# Patient Record
Sex: MALE | Race: WHITE | NOT HISPANIC OR LATINO | Employment: FULL TIME | URBAN - METROPOLITAN AREA
[De-identification: names, ages, dates, MRNs, and addresses within clinical notes are randomized per-mention and may not be internally consistent; named-entity substitution may affect disease eponyms.]

---

## 2023-05-27 DIAGNOSIS — M1A.09X0 CHRONIC GOUT OF MULTIPLE SITES, UNSPECIFIED CAUSE: Primary | ICD-10-CM

## 2023-05-30 RX ORDER — FEBUXOSTAT 80 MG/1
TABLET, FILM COATED ORAL
Qty: 90 TABLET | Refills: 3 | Status: SHIPPED | OUTPATIENT
Start: 2023-05-30 | End: 2024-06-04

## 2023-06-08 RX ORDER — COLCHICINE 0.6 MG/1
0.6 CAPSULE ORAL 2 TIMES DAILY
Qty: 60 CAPSULE | Refills: 2 | Status: SHIPPED | OUTPATIENT
Start: 2023-06-08 | End: 2023-11-13

## 2023-06-08 RX ORDER — COLCHICINE 0.6 MG/1
0.6 CAPSULE ORAL 2 TIMES DAILY
COMMUNITY
End: 2023-06-08 | Stop reason: SDUPTHER

## 2023-06-13 NOTE — PROGRESS NOTES
Subjective   JANI Temple is a 38 y.o. male who presents for status, and for follow-up.  HPI  Patient is a 38-year-old male with known history of gout, is here for follow-up and fasting blood work, takes medication as prescribed including Medicare and Uloric.  Review of Systems  10 system review pertinent as above  Objective     There were no vitals taken for this visit.   Physical Exam  HEENT: Atraumatic normocephalic the pupils are equal and round and reactive to light the sclerae nonicteric extraocular motion are intact.  Neck: Is supple without JVD no carotid bruits the trachea is midline there are no masses pulses are equal and bilateral with normal upstroke.  Skin: Normal.  Skin good texture.  Moist.  Good turgor.  No lesions, no rashes.  Lymph: No lymphadenopathy appreciated, no masses, no lesions  Lungs: Are clear to auscultation and percussion, good breath sounds bilaterally, no rhonchi, no wheezing, good diaphragmatic excursion.  Heart: Normal rate and normal rhythm S1, S2, no S3, no gallop, murmur or rub.  Abdomen: Soft, nontender, no organomegaly, good bowel sounds.    Extremities: Full range of motion, good pulses bilateral.  No cyanosis, no clubbing or edema.  Neuro: Cranial nerves II-XII are grossly intact there is no sensory or motor deficits.  Able to move all extremities.      Assessment/Plan     Patient is here for follow-up fasting blood work will be done in December  Patient ate today.     CBC BMP lipids AST ALT vitamin D 25 Uric acid level    History of chronic  Continue colchicine  And Mitigare    Continue with the low-fat, low-cholesterol diet,  I recommended Mediterranean diet, which include fish, chicken, vegetables and olive oil  Exercise daily for 30 minutes at least 3 times a week  Continue home medications    BMI 33.47 Kg meter sq  On mounjaro through an outside agency  Tripeptide for wt loss   Must lose weight.     Severe gout  On uloric and colchicine  Without daily colchicine  patient is extremely symptomatic    Problem List Items Addressed This Visit       BMI 33.0-33.9,adult - Primary    Relevant Medications    tirzepatide (Mounjaro) 5 mg/0.5 mL pen injector    Chronic gout with tophus    Relevant Medications    tirzepatide (Mounjaro) 5 mg/0.5 mL pen injector         Santiago Rosales MD

## 2023-06-15 ENCOUNTER — OFFICE VISIT (OUTPATIENT)
Dept: PRIMARY CARE | Facility: CLINIC | Age: 38
End: 2023-06-15
Payer: COMMERCIAL

## 2023-06-15 VITALS — HEIGHT: 76 IN | WEIGHT: 275 LBS | BODY MASS INDEX: 33.49 KG/M2

## 2023-06-15 DIAGNOSIS — M1A.9XX1 CHRONIC GOUT WITH TOPHUS, UNSPECIFIED CAUSE, UNSPECIFIED SITE: ICD-10-CM

## 2023-06-15 PROCEDURE — 1036F TOBACCO NON-USER: CPT | Performed by: INTERNAL MEDICINE

## 2023-06-15 PROCEDURE — 99214 OFFICE O/P EST MOD 30 MIN: CPT | Performed by: INTERNAL MEDICINE

## 2023-06-15 PROCEDURE — 3008F BODY MASS INDEX DOCD: CPT | Performed by: INTERNAL MEDICINE

## 2023-06-15 RX ORDER — TIRZEPATIDE 5 MG/.5ML
5 INJECTION, SOLUTION SUBCUTANEOUS
Qty: 3 ML | Refills: 0 | Status: SHIPPED | OUTPATIENT
Start: 2023-06-15 | End: 2024-03-19 | Stop reason: ALTCHOICE

## 2023-11-13 DIAGNOSIS — M1A.09X0 CHRONIC GOUT OF MULTIPLE SITES, UNSPECIFIED CAUSE: ICD-10-CM

## 2023-11-13 RX ORDER — COLCHICINE 0.6 MG/1
0.6 CAPSULE ORAL 2 TIMES DAILY
Qty: 60 CAPSULE | Refills: 0 | Status: SHIPPED | OUTPATIENT
Start: 2023-11-13 | End: 2024-03-04

## 2024-03-03 DIAGNOSIS — M1A.09X0 CHRONIC GOUT OF MULTIPLE SITES, UNSPECIFIED CAUSE: ICD-10-CM

## 2024-03-04 RX ORDER — COLCHICINE 0.6 MG/1
0.6 CAPSULE ORAL 2 TIMES DAILY
Qty: 60 CAPSULE | Refills: 0 | Status: SHIPPED | OUTPATIENT
Start: 2024-03-04 | End: 2024-03-05

## 2024-03-05 DIAGNOSIS — M1A.09X0 CHRONIC GOUT OF MULTIPLE SITES, UNSPECIFIED CAUSE: ICD-10-CM

## 2024-03-05 RX ORDER — COLCHICINE 0.6 MG/1
0.6 CAPSULE ORAL 2 TIMES DAILY
Qty: 60 CAPSULE | Refills: 3 | Status: SHIPPED | OUTPATIENT
Start: 2024-03-05

## 2024-03-15 NOTE — PROGRESS NOTES
Subjective   JANI Temple is a 39 y.o. male who presents for status, and for follow-up.  HPI  Patient is a 38-year-old male with known history of gout, is here for follow-up and fasting blood work, takes medication as prescribed including Medicare and Uloric.  Patient last blood test in May 13, 2022 over very good, he has not had any blood work since then. Doing well with , anxious  lately feels depressed, anhedonic lack of happiness.   Review of Systems  10 system review pertinent as above  Objective     Visit Vitals  /80   Pulse 78   Temp 36.3 °C (97.3 °F) (Temporal)   Resp 16      Physical Exam  HEENT: Atraumatic normocephalic the pupils are equal and round and reactive to light the sclerae nonicteric extraocular motion are intact.  Neck: Is supple without JVD no carotid bruits the trachea is midline there are no masses pulses are equal and bilateral with normal upstroke.  Skin: Normal.  Skin good texture.  Moist.  Good turgor.  No lesions, no rashes.  Lymph: No lymphadenopathy appreciated, no masses, no lesions  Lungs: Are clear to auscultation and percussion, good breath sounds bilaterally, no rhonchi, no wheezing, good diaphragmatic excursion.  Heart: Normal rate and normal rhythm S1, S2, no S3, no gallop, murmur or rub.  Abdomen: Soft, nontender, no organomegaly, good bowel sounds.    Extremities: Full range of motion, good pulses bilateral.  No cyanosis, no clubbing or edema.  Neuro: Cranial nerves II-XII are grossly intact there is no sensory or motor deficits.  Able to move all extremities.      Assessment/Plan     Number follow-up needs updated blood work    CBC BMP lipids AST ALT vitamin D 25 Uric acid level    History of chronic  Continue colchicine  And uloric low purine diet   And Mitigare    Depression situational   Short tempred anhedonia  Lack of desire  Effexor xr 37.5 mg daily    Continue with the low-fat, low-cholesterol diet,  I recommended Mediterranean diet, which include fish,  chicken, vegetables and olive oil  Exercise daily for 30 minutes at least 3 times a week  Continue home medications    BMI 33.47 Kg meter sq  On mounjaro through an outside agency  Tripeptide for wt loss   Must lose weight.     Severe gout  On uloric and colchicine  Without daily colchicine patient is extremely symptomatic    Problem List Items Addressed This Visit       BMI 33.0-33.9,adult - Primary    Relevant Orders    Basic Metabolic Panel    Chronic gout with tophus    Relevant Orders    Basic Metabolic Panel    CBC    Uric acid     Other Visit Diagnoses       Dyslipidemia        Relevant Orders    Lipid Panel    AST    ALT    Anxiety        Relevant Medications    venlafaxine XR (Effexor-XR) 37.5 mg 24 hr capsule            Santiago Rosales MD

## 2024-03-19 ENCOUNTER — OFFICE VISIT (OUTPATIENT)
Dept: PRIMARY CARE | Facility: CLINIC | Age: 39
End: 2024-03-19
Payer: COMMERCIAL

## 2024-03-19 VITALS
RESPIRATION RATE: 16 BRPM | TEMPERATURE: 97.3 F | HEART RATE: 78 BPM | SYSTOLIC BLOOD PRESSURE: 124 MMHG | HEIGHT: 76 IN | WEIGHT: 274 LBS | BODY MASS INDEX: 33.36 KG/M2 | DIASTOLIC BLOOD PRESSURE: 80 MMHG

## 2024-03-19 DIAGNOSIS — F41.9 ANXIETY: ICD-10-CM

## 2024-03-19 DIAGNOSIS — M1A.9XX1 CHRONIC GOUT WITH TOPHUS, UNSPECIFIED CAUSE, UNSPECIFIED SITE: ICD-10-CM

## 2024-03-19 DIAGNOSIS — E78.5 DYSLIPIDEMIA: ICD-10-CM

## 2024-03-19 PROCEDURE — 99214 OFFICE O/P EST MOD 30 MIN: CPT | Performed by: INTERNAL MEDICINE

## 2024-03-19 PROCEDURE — 1036F TOBACCO NON-USER: CPT | Performed by: INTERNAL MEDICINE

## 2024-03-19 PROCEDURE — 3008F BODY MASS INDEX DOCD: CPT | Performed by: INTERNAL MEDICINE

## 2024-03-19 RX ORDER — VENLAFAXINE HYDROCHLORIDE 37.5 MG/1
37.5 CAPSULE, EXTENDED RELEASE ORAL DAILY
Qty: 30 CAPSULE | Refills: 1 | Status: SHIPPED | OUTPATIENT
Start: 2024-03-19 | End: 2024-05-28

## 2024-03-19 ASSESSMENT — PAIN SCALES - GENERAL: PAINLEVEL: 0-NO PAIN

## 2024-04-19 ENCOUNTER — APPOINTMENT (OUTPATIENT)
Dept: PRIMARY CARE | Facility: CLINIC | Age: 39
End: 2024-04-19
Payer: COMMERCIAL

## 2024-05-10 ENCOUNTER — APPOINTMENT (OUTPATIENT)
Dept: PRIMARY CARE | Facility: CLINIC | Age: 39
End: 2024-05-10
Payer: COMMERCIAL

## 2024-05-25 DIAGNOSIS — F41.9 ANXIETY: ICD-10-CM

## 2024-05-28 RX ORDER — VENLAFAXINE HYDROCHLORIDE 37.5 MG/1
37.5 CAPSULE, EXTENDED RELEASE ORAL DAILY
Qty: 30 CAPSULE | Refills: 3 | Status: SHIPPED | OUTPATIENT
Start: 2024-05-28

## 2024-06-04 DIAGNOSIS — M1A.09X0 CHRONIC GOUT OF MULTIPLE SITES, UNSPECIFIED CAUSE: ICD-10-CM

## 2024-06-04 RX ORDER — FEBUXOSTAT 80 MG/1
80 TABLET, FILM COATED ORAL DAILY
Qty: 90 TABLET | Refills: 0 | Status: SHIPPED | OUTPATIENT
Start: 2024-06-04

## 2024-07-24 NOTE — PROGRESS NOTES
Subjective   JANI Temple is a 39 y.o. male who presents for status, Physical exam.  HPI  Here for a physical exam ,Patient is a 39-year-old male with known history of gout, is here for follow-up and fasting blood work, takes medication as prescribed including Medicare and Uloric.  Patient last blood test in May 13, 2022 over very good, he has not had any blood work since then. Doing well with , anxious  lately feels depressed, anhedonic lack of happiness. Patient works out 3-4 times per week, feeling well. Sleep is good  mostly ok.  Fasting today for blood work. Complaining of decrease libido.   Review of Systems  10 system review pertinent as above  Objective     Visit Vitals  /82   Pulse 78   Temp 36.6 °C (97.9 °F)   Resp 16        Physical Exam  HEENT: Atraumatic normocephalic the pupils are equal and round and reactive to light the sclerae nonicteric extraocular motion are intact.  Neck: Is supple without JVD no carotid bruits the trachea is midline there are no masses pulses are equal and bilateral with normal upstroke.  Skin: Normal.  Skin good texture.  Moist.  Good turgor.  No lesions, no rashes.  Lymph: No lymphadenopathy appreciated, no masses, no lesions  Lungs: Are clear to auscultation and percussion, good breath sounds bilaterally, no rhonchi, no wheezing, good diaphragmatic excursion.  Heart: Normal rate and normal rhythm S1, S2, no S3, no gallop, murmur or rub.  Abdomen: Soft, nontender, no organomegaly, good bowel sounds.    Extremities: Full range of motion, good pulses bilateral.  No cyanosis, no clubbing or edema.  Neuro: Cranial nerves II-XII are grossly intact there is no sensory or motor deficits.  Able to move all extremities.      Assessment/Plan     Physical exam     CBC BMP lipids AST ALT vitamin D 25 Uric acid level    Prevention    Colonoscopy Age  43  PSA   Age 43    Immunization    2/2 and one booster    Flu vaccine declined got sick from vaccine      Depression situational    Short tempred anhedonia  Lack of desire  Effexor xr 37.5 mg daily  Patient is doing well     Continue with the low-fat, low-cholesterol diet,  I recommended Mediterranean diet, which include fish, chicken, vegetables and olive oil Exercise daily for 30 minutes at least 3 times a week  Continue home medications  Diet and exercise    BMI 32.50 Kg meter sq  Lost 7 lbs  Diet and exercise   Low caloric restrictive diet.      Severe gout  Continue colchicine  And uloric low purine diet   Low purine diet keep your weight down  On uloric and colchicine  Without daily colchicine patient is extremely symptomatic    Problem List Items Addressed This Visit    None        Santiago Rosales MD

## 2024-07-26 ENCOUNTER — APPOINTMENT (OUTPATIENT)
Dept: PRIMARY CARE | Facility: CLINIC | Age: 39
End: 2024-07-26
Payer: COMMERCIAL

## 2024-07-26 VITALS
RESPIRATION RATE: 16 BRPM | BODY MASS INDEX: 32.51 KG/M2 | WEIGHT: 267 LBS | HEIGHT: 76 IN | HEART RATE: 78 BPM | SYSTOLIC BLOOD PRESSURE: 122 MMHG | DIASTOLIC BLOOD PRESSURE: 82 MMHG | TEMPERATURE: 97.9 F

## 2024-07-26 DIAGNOSIS — J30.2 SEASONAL ALLERGIES: ICD-10-CM

## 2024-07-26 DIAGNOSIS — E55.9 VITAMIN D DEFICIENCY: ICD-10-CM

## 2024-07-26 DIAGNOSIS — E78.00 ELEVATED LDL CHOLESTEROL LEVEL: ICD-10-CM

## 2024-07-26 DIAGNOSIS — R68.82 DECREASED LIBIDO: ICD-10-CM

## 2024-07-26 DIAGNOSIS — M1A.9XX1 CHRONIC GOUT WITH TOPHUS, UNSPECIFIED CAUSE, UNSPECIFIED SITE: ICD-10-CM

## 2024-07-26 DIAGNOSIS — E78.5 DYSLIPIDEMIA: ICD-10-CM

## 2024-07-26 DIAGNOSIS — E55.9 VITAMIN D INSUFFICIENCY: ICD-10-CM

## 2024-07-26 LAB
TESTOST SERPL-MCNC: 518 NG/DL (ref 240–1000)
URATE SERPL-MCNC: 4.5 MG/DL (ref 4–7.5)

## 2024-07-26 PROCEDURE — 84550 ASSAY OF BLOOD/URIC ACID: CPT

## 2024-07-26 PROCEDURE — 36415 COLL VENOUS BLD VENIPUNCTURE: CPT

## 2024-07-26 PROCEDURE — 84403 ASSAY OF TOTAL TESTOSTERONE: CPT

## 2024-07-26 ASSESSMENT — ENCOUNTER SYMPTOMS
DEPRESSION: 0
OCCASIONAL FEELINGS OF UNSTEADINESS: 0
LOSS OF SENSATION IN FEET: 0

## 2024-07-26 ASSESSMENT — PAIN SCALES - GENERAL: PAINLEVEL: 0-NO PAIN

## 2024-07-29 DIAGNOSIS — L72.3 SEBACEOUS CYST: Primary | ICD-10-CM

## 2024-07-31 PROBLEM — Z00.00 PHYSICAL EXAM: Status: ACTIVE | Noted: 2024-07-31

## 2024-08-02 ENCOUNTER — TELEPHONE (OUTPATIENT)
Dept: SURGERY | Facility: CLINIC | Age: 39
End: 2024-08-02
Payer: COMMERCIAL

## 2024-08-02 NOTE — TELEPHONE ENCOUNTER
Left voicemail to schedule with Dr. Cox, phone number provided    Patient is calling to confirm what his current dose of warfarin is.    Please call back.

## 2024-08-12 DIAGNOSIS — L72.3 INFECTED SEBACEOUS CYST: Primary | ICD-10-CM

## 2024-08-12 DIAGNOSIS — L08.9 INFECTED SEBACEOUS CYST: Primary | ICD-10-CM

## 2024-08-12 RX ORDER — CEPHALEXIN 500 MG/1
500 CAPSULE ORAL 2 TIMES DAILY
Qty: 20 CAPSULE | Refills: 0 | Status: SHIPPED | OUTPATIENT
Start: 2024-08-12 | End: 2024-08-19 | Stop reason: SDUPTHER

## 2024-08-17 ENCOUNTER — LAB REQUISITION (OUTPATIENT)
Dept: LAB | Facility: HOSPITAL | Age: 39
End: 2024-08-17
Payer: COMMERCIAL

## 2024-08-17 DIAGNOSIS — L02.212 CUTANEOUS ABSCESS OF BACK (ANY PART, EXCEPT BUTTOCK): ICD-10-CM

## 2024-08-17 PROCEDURE — 87185 SC STD ENZYME DETCJ PER NZM: CPT

## 2024-08-17 PROCEDURE — 87070 CULTURE OTHR SPECIMN AEROBIC: CPT

## 2024-08-17 PROCEDURE — 87075 CULTR BACTERIA EXCEPT BLOOD: CPT

## 2024-08-17 PROCEDURE — 87205 SMEAR GRAM STAIN: CPT

## 2024-08-17 NOTE — PROGRESS NOTES
Subjective   JANI Temple is a 39 y.o. male who presents for infected S.Cyst.  HPI  Here for a physical exam ,Patient is a 39-year-old male with known history of gout, is here for follow-up and fasting blood work, takes medication as prescribed including Medicare and Uloric.  Patient last blood test in May 13, 2022 over very good, he has not had any blood work since then. S, Cyst large infected  was seen at [facility] on Keflex was seen at [facility].   Review of Systems  10 system review pertinent as above  Objective     There were no vitals taken for this visit.       Physical Exam  HEENT: Atraumatic normocephalic the pupils are equal and round and reactive to light the sclerae nonicteric extraocular motion are intact.  Neck: Is supple without JVD no carotid bruits the trachea is midline there are no masses pulses are equal and bilateral with normal upstroke.  Skin: Normal.  Skin good texture.  Moist.  Good turgor.  No lesions, no rashes.  Lymph: No lymphadenopathy appreciated, no masses, no lesions  Lungs: Are clear to auscultation and percussion, good breath sounds bilaterally, no rhonchi, no wheezing, good diaphragmatic excursion.  Heart: Normal rate and normal rhythm S1, S2, no S3, no gallop, murmur or rub.  Abdomen: Soft, nontender, no organomegaly, good bowel sounds.    Extremities: Full range of motion, good pulses bilateral.  No cyanosis, no clubbing or edema.  Neuro: Cranial nerves II-XII are grossly intact there is no sensory or motor deficits.  Able to move all extremities.      Assessment/Plan     S.cyst  Back  Needs I&D  Referred to Dr Kenny Araujoflex 500 mg Tid     Colonoscopy Age  43  PSA   Age 43    Immunization    2/2 and one booster    Flu vaccine declined got sick from vaccine    Depression situational   Short tempred anhedonia  Lack of desire  Effexor xr 37.5 mg daily  Patient is doing well     Continue with the low-fat, low-cholesterol diet,  I recommended Mediterranean diet, which include fish, chicken,  vegetables and olive oil Exercise daily for 30 minutes at least 3 times a week  Continue home medications  Diet and exercise    BMI 31.89 Kg meter sq  Lost 7 lbs  Diet and exercise   Low caloric restrictive diet.      Severe gout  Continue colchicine  And uloric low purine diet   Low purine diet keep your weight down  On uloric and colchicine  Without daily colchicine patient is extremely symptomatic    Problem List Items Addressed This Visit    None  Visit Diagnoses       Infected sebaceous cyst        Relevant Medications    cephalexin (Keflex) 500 mg capsule              Santiago Rosales MD

## 2024-08-18 LAB
GRAM STN SPEC: ABNORMAL
GRAM STN SPEC: ABNORMAL

## 2024-08-19 ENCOUNTER — APPOINTMENT (OUTPATIENT)
Dept: PRIMARY CARE | Facility: CLINIC | Age: 39
End: 2024-08-19
Payer: COMMERCIAL

## 2024-08-19 VITALS — WEIGHT: 262 LBS | HEIGHT: 76 IN | BODY MASS INDEX: 31.9 KG/M2

## 2024-08-19 DIAGNOSIS — L72.3 INFECTED SEBACEOUS CYST: ICD-10-CM

## 2024-08-19 DIAGNOSIS — E55.9 VITAMIN D INSUFFICIENCY: ICD-10-CM

## 2024-08-19 DIAGNOSIS — E78.5 DYSLIPIDEMIA: Primary | ICD-10-CM

## 2024-08-19 DIAGNOSIS — L08.9 INFECTED SEBACEOUS CYST: ICD-10-CM

## 2024-08-19 PROCEDURE — 1036F TOBACCO NON-USER: CPT | Performed by: INTERNAL MEDICINE

## 2024-08-19 PROCEDURE — 99214 OFFICE O/P EST MOD 30 MIN: CPT | Performed by: INTERNAL MEDICINE

## 2024-08-19 PROCEDURE — 3008F BODY MASS INDEX DOCD: CPT | Performed by: INTERNAL MEDICINE

## 2024-08-19 RX ORDER — CEPHALEXIN 500 MG/1
500 CAPSULE ORAL 3 TIMES DAILY
Qty: 30 CAPSULE | Refills: 0 | Status: SHIPPED | OUTPATIENT
Start: 2024-08-19 | End: 2024-08-29

## 2024-08-19 ASSESSMENT — ENCOUNTER SYMPTOMS
LOSS OF SENSATION IN FEET: 0
OCCASIONAL FEELINGS OF UNSTEADINESS: 0
DEPRESSION: 0

## 2024-08-19 ASSESSMENT — PAIN SCALES - GENERAL: PAINLEVEL: 6

## 2024-08-22 LAB
B-LACTAMASE ORGANISM ISLT: NEGATIVE
BACTERIA SPEC CULT: ABNORMAL
GRAM STN SPEC: ABNORMAL
GRAM STN SPEC: ABNORMAL

## 2024-08-29 ENCOUNTER — APPOINTMENT (OUTPATIENT)
Dept: SURGERY | Facility: CLINIC | Age: 39
End: 2024-08-29
Payer: COMMERCIAL

## 2024-08-30 ENCOUNTER — OFFICE VISIT (OUTPATIENT)
Dept: SURGERY | Facility: CLINIC | Age: 39
End: 2024-08-30
Payer: COMMERCIAL

## 2024-08-30 VITALS
SYSTOLIC BLOOD PRESSURE: 117 MMHG | DIASTOLIC BLOOD PRESSURE: 76 MMHG | HEART RATE: 73 BPM | BODY MASS INDEX: 32.63 KG/M2 | WEIGHT: 268 LBS | TEMPERATURE: 97.5 F | HEIGHT: 76 IN

## 2024-08-30 DIAGNOSIS — L72.3 INFECTED SEBACEOUS CYST: Primary | ICD-10-CM

## 2024-08-30 DIAGNOSIS — L72.3 SEBACEOUS CYST: ICD-10-CM

## 2024-08-30 DIAGNOSIS — L08.9 INFECTED SEBACEOUS CYST: Primary | ICD-10-CM

## 2024-08-30 PROCEDURE — 99213 OFFICE O/P EST LOW 20 MIN: CPT | Performed by: SURGERY

## 2024-08-30 PROCEDURE — 3008F BODY MASS INDEX DOCD: CPT | Performed by: SURGERY

## 2024-08-30 PROCEDURE — 99203 OFFICE O/P NEW LOW 30 MIN: CPT | Performed by: SURGERY

## 2024-08-30 PROCEDURE — 1036F TOBACCO NON-USER: CPT | Performed by: SURGERY

## 2024-08-30 RX ORDER — SODIUM CHLORIDE, SODIUM LACTATE, POTASSIUM CHLORIDE, CALCIUM CHLORIDE 600; 310; 30; 20 MG/100ML; MG/100ML; MG/100ML; MG/100ML
20 INJECTION, SOLUTION INTRAVENOUS CONTINUOUS
OUTPATIENT
Start: 2024-08-30

## 2024-08-30 ASSESSMENT — PAIN SCALES - GENERAL: PAINLEVEL: 5

## 2024-08-30 ASSESSMENT — ENCOUNTER SYMPTOMS: DEPRESSION: 0

## 2024-08-30 NOTE — LETTER
"August 30, 2024     Santiago Rosales MD  [address] [facility] [address]    Patient: JANI Temple   YOB: 1985   Date of Visit: 8/30/2024       Dear Dr. Santiago Rosales MD:    Thank you for referring JANI Temple to me for evaluation. Below are my notes for this consultation.  If you have questions, please do not hesitate to call me. I look forward to following your patient along with you.       Sincerely,     Edmund Cox MD      CC: No Recipients  ______________________________________________________________________________________    History Of Present Illness  Ethan Temple \"ONEL" is a 39 y.o. male presenting with 3-week history of a painful cystic mass in his mid upper back.  He actually went to an urgent care about a week and a half ago and a incisional drainage procedure was done.  He has felt a great amount of relief since then but he continues to have drainage and some itching and discomfort..     Past Medical History  Past Medical History:   Diagnosis Date   • Personal history of other diseases of the nervous system and sense organs 07/01/2014    History of impacted cerumen   • Personal history of other diseases of the nervous system and sense organs 07/01/2014    History of earache   • Personal history of other infectious and parasitic diseases 06/07/2015    History of herpes zoster   • Personal history of other mental and behavioral disorders 06/24/2015    History of attention deficit disorder       Surgical History  No past surgical history on file.     Social History  He reports that he has never smoked. He has never been exposed to tobacco smoke. He has never used smokeless tobacco. He reports current alcohol use. He reports that he does not use drugs.    Family History  No family history on file.     Allergies  Patient has no known allergies.    Review of Systems  Constitutional: no weight loss, no fevers, no malaise  HEENT: negative  Neck: negative  Pulmonary: no " "SOB, no cough  CV: no chest pain, otherwise negative  GI: no pain, no diarrhea, no bloody stools, no constipation  : no hematuria, retention.  MS: no aches/pains  Neurologic: negative  Skin: no rashes, lesions  HEME: no bleeding tendency, no bruising  Psych: no mood issues    Physical Exam  General: well appearing, no acute distress, well nourished  HEENT: normal  Neck: supple  Pulmonary: lungs clear to auscultation bilaterally  CV: RR, S1S2, no murmurs.  Pulses palpable and equal.  Good capillary refill  Abdomen: soft, non tender, no masses  : grossly normal external genitalia  MS: grossly normal  Neurologic: alert and oriented, strength/sensation intact  Skin: Large sebaceous cyst residual cavity mid upper back.  There is an opening where I am able to expunged a little bit of purulence and sebaceous material.  Psych: mood appropriate    Last Recorded Vitals  Blood pressure 117/76, pulse 73, temperature 36.4 °C (97.5 °F), height 1.93 m (6' 4\"), weight 122 kg (268 lb).    Relevant Results         Assessment/Plan      Patient with a infected sebaceous cyst that continues to have persistent foul-smelling drainage.  It looks like it has been sufficiently decompressed but I think he would benefit from wide complete excision of the residual cavity.  This surgical procedure was explained to him in detail.  Risks and benefits addressed.  He would like to proceed.  I like to schedule this at the [facility] surgery center at his convenience and he is agreeable       I spent 30 minutes in the professional and overall care of this patient.      Edmund Cox MD    "

## 2024-08-30 NOTE — PROGRESS NOTES
"History Of Present Illness  Ethan Temple \"JANI\" is a 39 y.o. male presenting with 3-week history of a painful cystic mass in his mid upper back.  He actually went to an urgent care about a week and a half ago and a incisional drainage procedure was done.  He has felt a great amount of relief since then but he continues to have drainage and some itching and discomfort..     Past Medical History  Past Medical History:   Diagnosis Date    Personal history of other diseases of the nervous system and sense organs 07/01/2014    History of impacted cerumen    Personal history of other diseases of the nervous system and sense organs 07/01/2014    History of earache    Personal history of other infectious and parasitic diseases 06/07/2015    History of herpes zoster    Personal history of other mental and behavioral disorders 06/24/2015    History of attention deficit disorder       Surgical History  No past surgical history on file.     Social History  He reports that he has never smoked. He has never been exposed to tobacco smoke. He has never used smokeless tobacco. He reports current alcohol use. He reports that he does not use drugs.    Family History  No family history on file.     Allergies  Patient has no known allergies.    Review of Systems  Constitutional: no weight loss, no fevers, no malaise  HEENT: negative  Neck: negative  Pulmonary: no SOB, no cough  CV: no chest pain, otherwise negative  GI: no pain, no diarrhea, no bloody stools, no constipation  : no hematuria, retention.  MS: no aches/pains  Neurologic: negative  Skin: no rashes, lesions  HEME: no bleeding tendency, no bruising  Psych: no mood issues    Physical Exam  General: well appearing, no acute distress, well nourished  HEENT: normal  Neck: supple  Pulmonary: lungs clear to auscultation bilaterally  CV: RR, S1S2, no murmurs.  Pulses palpable and equal.  Good capillary refill  Abdomen: soft, non tender, no masses  : grossly normal " "external genitalia  MS: grossly normal  Neurologic: alert and oriented, strength/sensation intact  Skin: Large sebaceous cyst residual cavity mid upper back.  There is an opening where I am able to expunged a little bit of purulence and sebaceous material.  Psych: mood appropriate    Last Recorded Vitals  Blood pressure 117/76, pulse 73, temperature 36.4 °C (97.5 °F), height 1.93 m (6' 4\"), weight 122 kg (268 lb).    Relevant Results         Assessment/Plan       Patient with a infected sebaceous cyst that continues to have persistent foul-smelling drainage.  It looks like it has been sufficiently decompressed but I think he would benefit from wide complete excision of the residual cavity.  This surgical procedure was explained to him in detail.  Risks and benefits addressed.  He would like to proceed.  I like to schedule this at the [place] outpatient surgery center at his convenience and he is agreeable       I spent 30 minutes in the professional and overall care of this patient.      Edmund Cox MD    "

## 2024-09-04 RX ORDER — CETIRIZINE HYDROCHLORIDE 5 MG/1
TABLET, CHEWABLE ORAL DAILY
COMMUNITY

## 2024-09-04 NOTE — PREPROCEDURE INSTRUCTIONS
Pre-Op Instructions & Checklist   Your surgery has been scheduled at [facility] at [address] [place], [place], [address], [address], in the [facility]. Parking is to the left of the main entrance.  You will be contacted about the time of your surgery the day before your surgery (if your surgery is on a Monday you will be called the Friday before surgery). If you are unable to answer the phone, a detailed voicemail message will be left. Make sure that your voicemail box is not full so a message can be left. If you have not received a call by 3:00 pm you may call [phone] between the hours of 3:00 and 4:00 pm. Please be available by phone the night before/day of surgery in case there is a change in the schedule which may require you to arrive earlier/later.     14 DAYS BEFORE SURGERY STOP TAKING WEIGHT LOSS MEDICATIONS      7 DAYS BEFORE SURGERY STOP THESE MEDICATIONS:  Multiple Vitamins containing Vitamin E  Herbal supplements, Fish Oil, garlic pills, turmeric, CoQ enzyme  Stop taking aspirin, and aspirin-containing products as well as NSAID's such as Advil, Motrin, Aleve, Ibuprofen. Tylenol is okay to take for pain relief.   If you are currently taking Coumadin/Warfarin, we will have to coordinate that with your PCP &/or the Anticoagulation Clinic.     THE DAY BEFORE SURGERY:  Do not eat any food after midnight the night before surgery.   You are permitted to have no more than 4 ounces of clear liquids such as water, apple juice, plain tea or coffee (no milk or creamer), clear electrolyte-replenishing drinks such as Pedialyte, Gatorade, or Powerade (not yogurt or pulp-containing smoothies or juices such as orange juice) up to 3 hours before your arrival time.      DAY OF SURGERY TAKE THESE MEDICATIONS (if it is not listed, do not take it.)   Take: Effexor    If taking medications in a tablet/capsule form, take with a small sip of water.    ON THE MORNING OF SURGERY:  *Shower either  the night before your surgery or the morning of your surgery  *Do not use moisturizers, creams, lotions or perfume, or make-up.  *Wear comfortable, loose fitting clothing.   *All jewelry and valuables should be left at home.  *Prosthetic devices such as contact lenses, hearing aids, dentures, eyelash extensions, hairpins and body piercings must be removed before surgery. Bring containers for eyeglasses/contacts, dentures, or hearing aids with you.     Diabetics: Please check fasting blood sugars upon waking up.  If fasting blood sugars are <80ml/dl, please drink 100ml/3oz. of apple juice no later than 2 hours prior to surgery.        BRING WITH YOU:   *Photo ID and insurance card  *Current list of medicines and allergies  *Pacemaker/Defibrillator/Heart stent cards  *Copy of your complete Advanced Directive/DHPOA-if applicable     SMOKING:  *Quitting smoking can make a huge difference to your health and recovery from surgery.    *If you need help with quitting, call 2-508-QUIT-NOW.  Alcohol:  *No alcoholic beverages for 48 hours before surgery.     AFTER OUTPATIENT SURGERY:  *A responsible adult MUST accompany you at the time of discharge and stay with you for 24 hours after your surgery.  *You may NOT drive yourself home after surgery.  * You may use a taxi or ride sharing service ([employer], Uber) to return home ONLY if you are accompanied by a friend or family member  *Instructions for resuming your medications will be provided by your surgeon.     CONTACT SURGEON'S OFFICE IF YOU DEVELOP:  * Fever =/> 100.4 F   * New respiratory symptoms (e.g. cough, shortness of breath, respiratory distress, sore throat)  * Recent loss of taste or smell  *Flu like symptoms such as headache, fatigue or gastrointestinal symptoms  * If you develop any open sores, shingles, burning or painful urination   AND/OR:  * You no longer wish to have the surgery.  * Any other personal circumstances change that may lead to the need to cancel or  defer this surgery.  *You were admitted to any hospital within one week of your planned procedure.     If you have any questions regarding these preoperative instructions you may call [phone]. If you have questions regarding you surgical procedure, or post-operative care/recovery please call your surgeon's office.

## 2024-09-04 NOTE — H&P (VIEW-ONLY)
CPM/PAT Evaluation       Name: Ethan Temple   /Age: 1985       TELEMEDICINE ENCOUNTER  Patient was interviewed by telephone for preadmission testing perioperative risk assessment prior to surgery.    DATE OF CONSULT: 2024   REFERRING PROVIDER: Dr. Edmund Cox  SURGERY, DATE, AND LENGTH: Excision of back lesion; 2024; 60 minutes    CHIEF COMPLAINT  Infected sebaceous cyst involving upper back    HPI  Ethan Temple is a 39-year-old male with a 1 month history of a painful infected sebaceous cyst involving his mid upper back.  He was seen at an urgent care about 2 weeks ago where an incision drainage procedure was done.  He followed up with general surgery service for further evaluation and treatment.  He continues to have drainage, and discomfort.  He is currently completing a course of cephalexin.  He is scheduled for excision of back lesion.     ACTIVE PROBLEMS  Patient Active Problem List   Diagnosis    BMI 33.0-33.9,adult    Chronic gout with tophus    Dyslipidemia    Decreased libido    Elevated LDL cholesterol level    Seasonal allergies    Vitamin D insufficiency    Physical exam    Infected sebaceous cyst        PAST MEDICAL HISTORY  Past Medical History:   Diagnosis Date    Anxiety     Depression     Personal history of other diseases of the nervous system and sense organs 2014    History of impacted cerumen    Personal history of other diseases of the nervous system and sense organs 2014    History of earache    Personal history of other infectious and parasitic diseases 2015    History of herpes zoster        SURGICAL HISTORY  Past Surgical History:   Procedure Laterality Date    WISDOM TOOTH EXTRACTION          ANESTHESIA HISTORY  No adult anesthesia history.  Had wisdom tooth extraction under anesthesia in late teens.  Denies family history of malignant hyperthermia, or pseudocholinesterase deficiency.      SOCIAL HISTORY  Never smoker; EtOH:  0-3 drinks a week; no recreational drug use.  Patient states she does light strength training 2-4 times a week, and is able to do moderate ADLs such as heavy housework, light yard work.  He denies chest pain, BANUELOS.  METS >4    FAMILY HISTORY  No family history on file.     ALLERGIES  No Known Allergies     MEDICATIONS  No current facility-administered medications for this encounter.    Current Outpatient Medications:     colchicine (Mitigare) 0.6 mg capsule capsule, TAKE ONE CAPSULE BY MOUTH TWO TIMES A DAY, Disp: 60 capsule, Rfl: 3    febuxostat (Uloric) 80 mg tablet, TAKE ONE TABLET BY MOUTH EVERY DAY, Disp: 90 tablet, Rfl: 0    venlafaxine XR (Effexor-XR) 37.5 mg 24 hr capsule, TAKE ONE CAPSULE BY MOUTH EVERY DAY. do not crush or chew, Disp: 30 capsule, Rfl: 3    cetirizine (ZyrTEC) 5 mg chewable tablet, Chew once daily., Disp: , Rfl:      REVIEW OF SYSTEMS  Review of Systems   Skin:         Infected sebaceous cyst involving mid upper back.   All other systems reviewed and are negative.    STOP BANG:  Obstructive Sleep Apnea (MELCHOR): Low Risk  Total Score: 1              Patient is male    Criteria that do not apply:    Patient snores loudly    Patient is often tired    Patient has been observed to stop breathing during sleep    Patient has high blood pressure or is being treated for high blood pressure    Patient BMI is greater than 35 kg/m2    Patient is over 50 years old    Patient's neck circumference is greater than 17 inches (male) or 16 inches (female)            PHYSICAL EXAM  Deferred    AIRWAY EXAM  Deferred    VITALS  No vitals taken for telemedicine visit  BMI Readings from Last 1 Encounters:   08/30/24 32.62 kg/m²      BP Readings from Last 4 Encounters:   08/30/24 117/76   07/26/24 122/82   03/19/24 124/80        LABS  CBC, BMP from 07/26/2024.    ASSESSMENT/PLAN  Infected sebaceous cyst of mid upper back  Excision of back lesion      This note was created in part upon personal review of patient's  medical records.  Speech recognition transcription software was used in the creation of this note. Despite proofreading, several typographical errors might be present that might affect the meaning of the content.

## 2024-09-10 ENCOUNTER — ANESTHESIA EVENT (OUTPATIENT)
Dept: OPERATING ROOM | Facility: CLINIC | Age: 39
End: 2024-09-10
Payer: COMMERCIAL

## 2024-09-11 ENCOUNTER — HOSPITAL ENCOUNTER (OUTPATIENT)
Facility: CLINIC | Age: 39
Setting detail: OUTPATIENT SURGERY
Discharge: HOME | End: 2024-09-11
Attending: SURGERY | Admitting: SURGERY
Payer: COMMERCIAL

## 2024-09-11 ENCOUNTER — ANESTHESIA (OUTPATIENT)
Dept: OPERATING ROOM | Facility: CLINIC | Age: 39
End: 2024-09-11
Payer: COMMERCIAL

## 2024-09-11 VITALS
HEART RATE: 55 BPM | SYSTOLIC BLOOD PRESSURE: 100 MMHG | TEMPERATURE: 97.7 F | DIASTOLIC BLOOD PRESSURE: 59 MMHG | RESPIRATION RATE: 16 BRPM | HEIGHT: 76 IN | BODY MASS INDEX: 31.95 KG/M2 | WEIGHT: 262.35 LBS | OXYGEN SATURATION: 97 %

## 2024-09-11 DIAGNOSIS — L08.9 INFECTED SEBACEOUS CYST: Primary | ICD-10-CM

## 2024-09-11 DIAGNOSIS — L72.3 INFECTED SEBACEOUS CYST: Primary | ICD-10-CM

## 2024-09-11 PROCEDURE — 2500000004 HC RX 250 GENERAL PHARMACY W/ HCPCS (ALT 636 FOR OP/ED): Performed by: SURGERY

## 2024-09-11 PROCEDURE — 2720000007 HC OR 272 NO HCPCS: Performed by: SURGERY

## 2024-09-11 PROCEDURE — 2500000001 HC RX 250 WO HCPCS SELF ADMINISTERED DRUGS (ALT 637 FOR MEDICARE OP): Performed by: SURGERY

## 2024-09-11 PROCEDURE — 11406 EXC TR-EXT B9+MARG >4.0 CM: CPT | Performed by: SURGERY

## 2024-09-11 PROCEDURE — 3700000001 HC GENERAL ANESTHESIA TIME - INITIAL BASE CHARGE: Performed by: SURGERY

## 2024-09-11 PROCEDURE — A11406 PR EXC SKIN BENIG >4 CM TRUNK,ARM,LEG: Performed by: NURSE ANESTHETIST, CERTIFIED REGISTERED

## 2024-09-11 PROCEDURE — 7100000010 HC PHASE TWO TIME - EACH INCREMENTAL 1 MINUTE: Performed by: SURGERY

## 2024-09-11 PROCEDURE — 3600000003 HC OR TIME - INITIAL BASE CHARGE - PROCEDURE LEVEL THREE: Performed by: SURGERY

## 2024-09-11 PROCEDURE — 3700000002 HC GENERAL ANESTHESIA TIME - EACH INCREMENTAL 1 MINUTE: Performed by: SURGERY

## 2024-09-11 PROCEDURE — 12032 INTMD RPR S/A/T/EXT 2.6-7.5: CPT | Performed by: SURGERY

## 2024-09-11 PROCEDURE — 7100000009 HC PHASE TWO TIME - INITIAL BASE CHARGE: Performed by: SURGERY

## 2024-09-11 PROCEDURE — 2500000005 HC RX 250 GENERAL PHARMACY W/O HCPCS: Performed by: SURGERY

## 2024-09-11 PROCEDURE — 2500000004 HC RX 250 GENERAL PHARMACY W/ HCPCS (ALT 636 FOR OP/ED): Performed by: NURSE ANESTHETIST, CERTIFIED REGISTERED

## 2024-09-11 PROCEDURE — 3600000008 HC OR TIME - EACH INCREMENTAL 1 MINUTE - PROCEDURE LEVEL THREE: Performed by: SURGERY

## 2024-09-11 PROCEDURE — A11406 PR EXC SKIN BENIG >4 CM TRUNK,ARM,LEG: Performed by: ANESTHESIOLOGY

## 2024-09-11 RX ORDER — ALBUTEROL SULFATE 0.83 MG/ML
2.5 SOLUTION RESPIRATORY (INHALATION) ONCE AS NEEDED
Status: DISCONTINUED | OUTPATIENT
Start: 2024-09-11 | End: 2024-09-11 | Stop reason: HOSPADM

## 2024-09-11 RX ORDER — POVIDONE-IODINE 7.5 MG/ML
SOLUTION TOPICAL AS NEEDED
Status: DISCONTINUED | OUTPATIENT
Start: 2024-09-11 | End: 2024-09-11 | Stop reason: HOSPADM

## 2024-09-11 RX ORDER — FENTANYL CITRATE 50 UG/ML
INJECTION, SOLUTION INTRAMUSCULAR; INTRAVENOUS AS NEEDED
Status: DISCONTINUED | OUTPATIENT
Start: 2024-09-11 | End: 2024-09-11

## 2024-09-11 RX ORDER — MIDAZOLAM HYDROCHLORIDE 1 MG/ML
INJECTION, SOLUTION INTRAMUSCULAR; INTRAVENOUS AS NEEDED
Status: DISCONTINUED | OUTPATIENT
Start: 2024-09-11 | End: 2024-09-11

## 2024-09-11 RX ORDER — LIDOCAINE IN NACL,ISO-OSMOT/PF 30 MG/3 ML
0.1 SYRINGE (ML) INJECTION ONCE
Status: DISCONTINUED | OUTPATIENT
Start: 2024-09-11 | End: 2024-09-11 | Stop reason: HOSPADM

## 2024-09-11 RX ORDER — LABETALOL HYDROCHLORIDE 5 MG/ML
5 INJECTION, SOLUTION INTRAVENOUS ONCE AS NEEDED
Status: DISCONTINUED | OUTPATIENT
Start: 2024-09-11 | End: 2024-09-11 | Stop reason: HOSPADM

## 2024-09-11 RX ORDER — METOCLOPRAMIDE HYDROCHLORIDE 5 MG/ML
10 INJECTION INTRAMUSCULAR; INTRAVENOUS ONCE AS NEEDED
Status: DISCONTINUED | OUTPATIENT
Start: 2024-09-11 | End: 2024-09-11 | Stop reason: HOSPADM

## 2024-09-11 RX ORDER — SODIUM CHLORIDE, SODIUM LACTATE, POTASSIUM CHLORIDE, CALCIUM CHLORIDE 600; 310; 30; 20 MG/100ML; MG/100ML; MG/100ML; MG/100ML
20 INJECTION, SOLUTION INTRAVENOUS CONTINUOUS
Status: DISCONTINUED | OUTPATIENT
Start: 2024-09-11 | End: 2024-09-11 | Stop reason: HOSPADM

## 2024-09-11 RX ORDER — PROPOFOL 10 MG/ML
INJECTION, EMULSION INTRAVENOUS CONTINUOUS PRN
Status: DISCONTINUED | OUTPATIENT
Start: 2024-09-11 | End: 2024-09-11

## 2024-09-11 RX ORDER — FENTANYL CITRATE 50 UG/ML
50 INJECTION, SOLUTION INTRAMUSCULAR; INTRAVENOUS EVERY 5 MIN PRN
Status: DISCONTINUED | OUTPATIENT
Start: 2024-09-11 | End: 2024-09-11 | Stop reason: HOSPADM

## 2024-09-11 RX ORDER — IBUPROFEN 600 MG/1
600 TABLET ORAL EVERY 6 HOURS PRN
Qty: 20 TABLET | Refills: 0 | Status: SHIPPED | OUTPATIENT
Start: 2024-09-11

## 2024-09-11 RX ORDER — ONDANSETRON HYDROCHLORIDE 2 MG/ML
4 INJECTION, SOLUTION INTRAVENOUS ONCE AS NEEDED
Status: DISCONTINUED | OUTPATIENT
Start: 2024-09-11 | End: 2024-09-11 | Stop reason: HOSPADM

## 2024-09-11 RX ORDER — OXYCODONE HYDROCHLORIDE 5 MG/1
5 TABLET ORAL EVERY 6 HOURS PRN
Qty: 5 TABLET | Refills: 0 | Status: SHIPPED | OUTPATIENT
Start: 2024-09-11

## 2024-09-11 RX ORDER — ACETAMINOPHEN 325 MG/1
650 TABLET ORAL EVERY 4 HOURS PRN
Status: DISCONTINUED | OUTPATIENT
Start: 2024-09-11 | End: 2024-09-11 | Stop reason: HOSPADM

## 2024-09-11 RX ORDER — LIDOCAINE HYDROCHLORIDE 10 MG/ML
INJECTION, SOLUTION INFILTRATION; PERINEURAL AS NEEDED
Status: DISCONTINUED | OUTPATIENT
Start: 2024-09-11 | End: 2024-09-11 | Stop reason: HOSPADM

## 2024-09-11 RX ORDER — ONDANSETRON HYDROCHLORIDE 2 MG/ML
INJECTION, SOLUTION INTRAVENOUS AS NEEDED
Status: DISCONTINUED | OUTPATIENT
Start: 2024-09-11 | End: 2024-09-11

## 2024-09-11 RX ORDER — BUPIVACAINE HYDROCHLORIDE 5 MG/ML
INJECTION, SOLUTION PERINEURAL AS NEEDED
Status: DISCONTINUED | OUTPATIENT
Start: 2024-09-11 | End: 2024-09-11 | Stop reason: HOSPADM

## 2024-09-11 RX ORDER — FENTANYL CITRATE 50 UG/ML
25 INJECTION, SOLUTION INTRAMUSCULAR; INTRAVENOUS EVERY 5 MIN PRN
Status: DISCONTINUED | OUTPATIENT
Start: 2024-09-11 | End: 2024-09-11 | Stop reason: HOSPADM

## 2024-09-11 RX ORDER — SODIUM CHLORIDE 0.9 G/100ML
IRRIGANT IRRIGATION AS NEEDED
Status: DISCONTINUED | OUTPATIENT
Start: 2024-09-11 | End: 2024-09-11 | Stop reason: HOSPADM

## 2024-09-11 RX ORDER — CEFAZOLIN 1 G/1
INJECTION, POWDER, FOR SOLUTION INTRAVENOUS AS NEEDED
Status: DISCONTINUED | OUTPATIENT
Start: 2024-09-11 | End: 2024-09-11

## 2024-09-11 RX ORDER — SODIUM CHLORIDE, SODIUM LACTATE, POTASSIUM CHLORIDE, CALCIUM CHLORIDE 600; 310; 30; 20 MG/100ML; MG/100ML; MG/100ML; MG/100ML
100 INJECTION, SOLUTION INTRAVENOUS CONTINUOUS
Status: DISCONTINUED | OUTPATIENT
Start: 2024-09-11 | End: 2024-09-11 | Stop reason: HOSPADM

## 2024-09-11 SDOH — HEALTH STABILITY: MENTAL HEALTH: CURRENT SMOKER: 1

## 2024-09-11 ASSESSMENT — COLUMBIA-SUICIDE SEVERITY RATING SCALE - C-SSRS
6. HAVE YOU EVER DONE ANYTHING, STARTED TO DO ANYTHING, OR PREPARED TO DO ANYTHING TO END YOUR LIFE?: NO
2. HAVE YOU ACTUALLY HAD ANY THOUGHTS OF KILLING YOURSELF?: NO
1. IN THE PAST MONTH, HAVE YOU WISHED YOU WERE DEAD OR WISHED YOU COULD GO TO SLEEP AND NOT WAKE UP?: NO

## 2024-09-11 ASSESSMENT — PAIN - FUNCTIONAL ASSESSMENT
PAIN_FUNCTIONAL_ASSESSMENT: 0-10

## 2024-09-11 ASSESSMENT — PAIN SCALES - GENERAL
PAIN_LEVEL: 0
PAINLEVEL_OUTOF10: 0 - NO PAIN
PAINLEVEL_OUTOF10: 0 - NO PAIN
PAINLEVEL_OUTOF10: 2

## 2024-09-11 NOTE — ANESTHESIA POSTPROCEDURE EVALUATION
"Patient: Ethan Temple \"CJ\"    Procedure Summary       Date: 09/11/24 Room / Location: [facility] OR 04 / Virtual [facility] OR    Anesthesia Start: 1310 Anesthesia Stop: 1440    Procedure: Excision Lesion Back (Back) Diagnosis:       Infected sebaceous cyst      (Infected sebaceous cyst [L72.3, L08.9])    Surgeons: Edmund Cox MD Responsible Provider: Fernanda Harp MD    Anesthesia Type: MAC ASA Status: 2            Anesthesia Type: MAC    Vitals Value Taken Time   BP 94/55 09/11/24 1454   Temp 36.3 °C (97.3 °F) 09/11/24 1439   Pulse 58 09/11/24 1454   Resp 16 09/11/24 1454   SpO2 95 % 09/11/24 1454       Anesthesia Post Evaluation    Patient location during evaluation: PACU  Patient participation: complete - patient cannot participate  Level of consciousness: awake  Pain score: 0  Pain management: adequate  Airway patency: patent  Cardiovascular status: acceptable  Respiratory status: acceptable  Hydration status: acceptable  Postoperative Nausea and Vomiting: none        There were no known notable events for this encounter.    "

## 2024-09-11 NOTE — ANESTHESIA PREPROCEDURE EVALUATION
"Patient: Ethan Temple \"CJ\"    Procedure Information       Date/Time: 09/11/24 1330    Procedure: Excision Lesion Back (Back)    Location: [facility] [place] [facility] OR 04 / Virtual [facility] [place] [facility] OR    Surgeons: Edmund Cox MD            Relevant Problems   Anesthesia (within normal limits)      Cardiac (within normal limits)      Pulmonary (within normal limits)      Neuro (within normal limits)      Liver (within normal limits)      Endocrine (within normal limits)      Hematology (within normal limits)      Musculoskeletal   (+) Chronic gout with tophus      HEENT   (+) Seasonal allergies      ID   (+) Infected sebaceous cyst       Clinical information reviewed:   Tobacco  Allergies  Meds  Problems  Med Hx  Surg Hx   Fam Hx  Soc   Hx        NPO Detail:  NPO/Void Status  Date of Last Liquid: 09/11/24  Time of Last Liquid: 0900  Date of Last Solid: 09/10/24  Time of Last Solid: 2000         Physical Exam    Airway  Mallampati: I  TM distance: >3 FB  Neck ROM: full     Cardiovascular - normal exam     Dental    Pulmonary - normal exam     Abdominal - normal exam         Anesthesia Plan    History of general anesthesia?: yes  History of complications of general anesthesia?: no    ASA 2     MAC     The patient is a current smoker.  Patient was previously instructed to abstain from smoking on day of procedure.    Anesthetic plan and risks discussed with patient.    Plan discussed with CRNA.  "

## 2024-09-11 NOTE — OP NOTE
"Excision Lesion Back Operative Note     Date: 2024  [place] [facility] [place] [facility] [place]    Name: Ethan Temple \"JANI\", : 1985, Age: 39 y.o., MRN: [id number], Sex: male    Diagnosis  Pre-op Diagnosis      * Infected sebaceous cyst [L72.3, L08.9] Post-op Diagnosis     * Infected sebaceous cyst [L72.3, L08.9]     Procedures  Excision Lesion Back  91121 - [place] EXC B9 LESION MRGN XCP SK TG T/A/L >4.0 CM    Intermediate closure  Surgeons      * Edmund Cox - Primary    Resident/Fellow/Other Assistant:  Surgeons and Role:  * No surgeons found with a matching role *    Procedure Summary  Anesthesia: Monitor Anesthesia Care  ASA: II  Anesthesia Staff: Anesthesiologist: Fernanda Harp MD  CRNA: JESSE Gonzalez-CRNA  Estimated Blood Loss: 50mL  Intra-op Medications:   Administrations occurring from 1330 to 1430 on 24:   Medication Name Total Dose   sodium chloride 0.9 % irrigation solution 250 mL   BUPivacaine HCl (Marcaine) 0.5 % (5 mg/mL) injection 20 mL   lidocaine (Xylocaine) 10 mg/mL (1 %) injection 20 mL   povidone-iodine (Betadine) 7.5 % soap 1 Application              Anesthesia Record               Intraprocedure I/O Totals          Intake    Dexmedetomidine 0.00 mL    The total shown is the total volume documented since Anesthesia Start was filed.    Propofol Drip 0.00 mL    The total shown is the total volume documented since Anesthesia Start was filed.    Total Intake 0 mL          Specimen:   ID Type Source Tests Collected by Time   1 : A. RUPTURED DERMOID CYST, 11 CM X 7 CM Tissue SKIN CYST SURGICAL PATHOLOGY EXAM Edmund Cox MD 2024 1337        Staff:   Circulator: Eileen  [place] Person: Miranda         Drains and/or Catheters: * None in log *    Tourniquet Times:         Implants:     Findings: Giant ruptured epidermoid cyst back    Indications: JANI Temple is an 39 y.o. male who is having surgery for Infected sebaceous cyst [L72.3, L08.9].     The patient was seen in " the preoperative area. The risks, benefits, complications, treatment options, non-operative alternatives, expected recovery and outcomes were discussed with the patient. The possibilities of reaction to medication, pulmonary aspiration, injury to surrounding structures, bleeding, recurrent infection, the need for additional procedures, failure to diagnose a condition, and creating a complication requiring transfusion or operation were discussed with the patient. The patient concurred with the proposed plan, giving informed consent.  The site of surgery was properly noted/marked if necessary per policy. The patient has been actively warmed in preoperative area. Preoperative antibiotics have been ordered and given within 1 hours of incision. Venous thrombosis prophylaxis have been ordered including bilateral sequential compression devices    Procedure Details: Patient was brought to the OR and placed right lateral decubitus position.  We did our timeout confirm patient procedure.  Antibiotics were given and IV sedation administered.  We electrically clipped prepped and draped the area of concern sterilely in the mid upper back.  I then bridgette elliptical transverse lines around the large area of indurated skin and we injected 30 cc of the local.  I then made sharp incision these lines with scalpel.  And then with electrocautery wide en bloc excision of the entire cystic cavity was performed down to the level of the fascia of the back.  This was sent off the field.  It measured out 7 x 11 cm.  We then copiously irrigated our wound make sure it was hemostatic.  Subcutaneous flaps were raised superiorly and inferiorly to facilitate closure.  Some Ganesh topical hemostatic agent was sprayed in the wound bed.  We then closed the deeper layer with interrupted 2-0 Vicryl.  Dermis reapproximated with 3-0 Vicryl and the skin was closed with a running 4-0 Monocryl subcuticular suture.  Steri-Strips were applied along with a  pressure dressing any ultimately went to the recovery room in satisfactory condition.    Complications:  None; patient tolerated the procedure well.    Disposition: PACU - hemodynamically stable.  Condition: stable         Additional Details:     Attending Attestation: I was present for the entire procedure.    Edmund Cox  Phone Number: [phone]

## 2024-09-19 DIAGNOSIS — M1A.09X0 CHRONIC GOUT OF MULTIPLE SITES, UNSPECIFIED CAUSE: ICD-10-CM

## 2024-09-19 LAB
LABORATORY COMMENT REPORT: NORMAL
PATH REPORT.FINAL DX SPEC: NORMAL
PATH REPORT.GROSS SPEC: NORMAL
PATH REPORT.RELEVANT HX SPEC: NORMAL
PATH REPORT.TOTAL CANCER: NORMAL

## 2024-09-19 RX ORDER — FEBUXOSTAT 80 MG/1
80 TABLET, FILM COATED ORAL DAILY
Qty: 90 TABLET | Refills: 0 | Status: SHIPPED | OUTPATIENT
Start: 2024-09-19

## 2024-09-20 DIAGNOSIS — F41.9 ANXIETY: ICD-10-CM

## 2024-09-20 RX ORDER — VENLAFAXINE HYDROCHLORIDE 37.5 MG/1
37.5 CAPSULE, EXTENDED RELEASE ORAL DAILY
Qty: 30 CAPSULE | Refills: 0 | Status: SHIPPED | OUTPATIENT
Start: 2024-09-20

## 2024-09-25 ENCOUNTER — OFFICE VISIT (OUTPATIENT)
Dept: SURGERY | Facility: CLINIC | Age: 39
End: 2024-09-25
Payer: COMMERCIAL

## 2024-09-25 VITALS — DIASTOLIC BLOOD PRESSURE: 81 MMHG | HEART RATE: 71 BPM | TEMPERATURE: 97.3 F | SYSTOLIC BLOOD PRESSURE: 122 MMHG

## 2024-09-25 DIAGNOSIS — L08.9 INFECTED EPIDERMOID CYST: Primary | ICD-10-CM

## 2024-09-25 DIAGNOSIS — L72.0 INFECTED EPIDERMOID CYST: Primary | ICD-10-CM

## 2024-09-25 PROCEDURE — 1036F TOBACCO NON-USER: CPT | Performed by: SURGERY

## 2024-09-25 PROCEDURE — 99211 OFF/OP EST MAY X REQ PHY/QHP: CPT | Performed by: SURGERY

## 2024-09-25 ASSESSMENT — PAIN SCALES - GENERAL: PAINLEVEL: 0-NO PAIN

## 2024-09-25 ASSESSMENT — ENCOUNTER SYMPTOMS: DEPRESSION: 0

## 2024-09-25 NOTE — PROGRESS NOTES
Assessment/Plan   Overall doing well. I reviewed path report.  Wound without infection. Can follow up in 3-4 weeks for reassessment, to see if seroma needs to be aspirated     Subjective   Feeling better this week after wide excision ruptured epidermoid cyst.  No drainage       Objective     Physical Exam  NAD  A&Ox3  Non icteric  Wound well approximated.  No erythema.  Scabbed area at midpoint. Small seroma.     Relevant Results      No results found for this or any previous visit (from the past 24 hour(s)).  15  Order: [id number]   Collected 9/11/2024 13:37       Status: Final result       Visible to patient: Yes (seen)       Dx: Infected sebaceous cyst    0 Result Notes      Component    FINAL DIAGNOSIS   A. SKIN, BACK, MIDLINE, EXCISION:  --CYST-LIKE COLLECTION OF GRANULATION TISSUE AND BRISK ACUTE, CHRONIC AND GRANULOMATOUS INFLAMMATION WITH FOCAL KERATIN FRAGMENTS, COMPATIBLE WITH RUPTURED EPIDERMOID CYST AND ORGANIZING ABSCESS     COMMENT:  No epithelial lining is seen.  Clinicopathologic correlation is necessary.    Electronically signed by Laila Solano MD on 9/19/2024 at 1118            I spent 25 minutes in the professional and overall care of this patient.      Edmund Cox MD

## 2024-09-25 NOTE — LETTER
September 25, 2024     Santiago Rosales MD  [address] [facility] [address]    Patient: JANI Temple   YOB: 1985   Date of Visit: 9/25/2024       Dear Dr. Santiago Rosales MD:    Thank you for referring JANI Temple to me for evaluation. Below are my notes for this consultation.  If you have questions, please do not hesitate to call me. I look forward to following your patient along with you.       Sincerely,     Edmund Cox MD      CC: No Recipients  ______________________________________________________________________________________    Assessment/Plan  Overall doing well. I reviewed path report.  Wound without infection. Can follow up in 3-4 weeks for reassessment, to see if seroma needs to be aspirated     Subjective  Feeling better this week after wide excision ruptured epidermoid cyst.  No drainage       Objective    Physical Exam  NAD  A&Ox3  Non icteric  Wound well approximated.  No erythema.  Scabbed area at midpoint. Small seroma.     Relevant Results      No results found for this or any previous visit (from the past 24 hour(s)).  15  Order: [id number]   Collected 9/11/2024 13:37       Status: Final result       Visible to patient: Yes (seen)       Dx: Infected sebaceous cyst    0 Result Notes      Component    FINAL DIAGNOSIS   A. SKIN, BACK, MIDLINE, EXCISION:  --CYST-LIKE COLLECTION OF GRANULATION TISSUE AND BRISK ACUTE, CHRONIC AND GRANULOMATOUS INFLAMMATION WITH FOCAL KERATIN FRAGMENTS, COMPATIBLE WITH RUPTURED EPIDERMOID CYST AND ORGANIZING ABSCESS     COMMENT:  No epithelial lining is seen.  Clinicopathologic correlation is necessary.    Electronically signed by Laila Solano MD on 9/19/2024 at 1118            I spent 25 minutes in the professional and overall care of this patient.      Edmund Cox MD

## 2024-10-18 DIAGNOSIS — F41.9 ANXIETY: ICD-10-CM

## 2024-10-18 RX ORDER — VENLAFAXINE HYDROCHLORIDE 37.5 MG/1
37.5 CAPSULE, EXTENDED RELEASE ORAL DAILY
Qty: 30 CAPSULE | Refills: 0 | Status: SHIPPED | OUTPATIENT
Start: 2024-10-18

## 2024-11-04 DIAGNOSIS — M1A.09X0 CHRONIC GOUT OF MULTIPLE SITES, UNSPECIFIED CAUSE: ICD-10-CM

## 2024-11-04 RX ORDER — COLCHICINE 0.6 MG/1
0.6 CAPSULE ORAL 2 TIMES DAILY
Qty: 60 CAPSULE | Refills: 3 | Status: SHIPPED | OUTPATIENT
Start: 2024-11-04

## 2024-11-06 DIAGNOSIS — H92.03 OTALGIA OF BOTH EARS: Primary | ICD-10-CM

## 2024-11-06 DIAGNOSIS — H61.23 BILATERAL IMPACTED CERUMEN: ICD-10-CM

## 2024-11-11 ENCOUNTER — APPOINTMENT (OUTPATIENT)
Dept: AUDIOLOGY | Facility: CLINIC | Age: 39
End: 2024-11-11
Payer: COMMERCIAL

## 2024-11-21 DIAGNOSIS — F41.9 ANXIETY: ICD-10-CM

## 2024-11-21 RX ORDER — VENLAFAXINE HYDROCHLORIDE 37.5 MG/1
37.5 CAPSULE, EXTENDED RELEASE ORAL DAILY
Qty: 30 CAPSULE | Refills: 0 | Status: SHIPPED | OUTPATIENT
Start: 2024-11-21

## 2024-12-10 ENCOUNTER — CLINICAL SUPPORT (OUTPATIENT)
Dept: AUDIOLOGY | Facility: CLINIC | Age: 39
End: 2024-12-10
Payer: COMMERCIAL

## 2024-12-10 DIAGNOSIS — R94.128 ABNORMAL TYMPANOGRAM: Primary | ICD-10-CM

## 2024-12-10 PROCEDURE — 92550 TYMPANOMETRY & REFLEX THRESH: CPT | Performed by: AUDIOLOGIST

## 2024-12-10 PROCEDURE — 92557 COMPREHENSIVE HEARING TEST: CPT | Performed by: AUDIOLOGIST

## 2024-12-10 NOTE — PROGRESS NOTES
"Name: Ethan Temple  YOB: 1985  Age: 39 y.o.    Date of Evaluation:  12/10/2024   Ethan Temple is seen today at the request of Santiago Rosales MD for an evaluation of hearing.  Ethan \"JANI\" reports a recent history of difficulty hearing. He feels he asks for repetition often. Denied tinnitus, dizziness, otalgia, aural fullness, and a family history of hearing loss.     Evaluation:  Otoscopy revealed non-occluding cerumen with visible tympanic membranes bilaterally.    Immittance:  Right: Type C, abnormal negative middle ear pressure with enhanced tympanic membrane compliance and normal ear canal volume.  Left: Type C, abnormal negative middle ear pressure with enhanced tympanic membrane compliance and normal ear canal volume.    Ipsilateral Acoustic Reflexes:  Right: Responses present at expected levels 500-2000 Hz. Response absent at 4000 Hz.  Left: Responses present at expected levels 500-1000 Hz. Responses absent 1616-6886 Hz.    Behavioral Audiometry:  Right: Normal hearing sensitivity 125-8000 Hz. Excellent word understanding (100 %) at 50 dB HL.  Left: Normal hearing sensitivity 125-8000 Hz Excellent word understanding (100 %) at 50 dB HL.    Pure tone averages in agreement with speech reception thresholds.    Results:  Today's results were discussed with the patient indicating normal hearing sensitivity in both ears. Abnormal middle ear pressure and enhanced tympanic membrane compliance bilaterally. Excellent word understanding bilaterally.    Treatment Plan:  Follow-up with referring provider  Retest hearing in conjunction with medical management or if a change in hearing occurs      Time: 2383-4583    Completed by:  Ponce Bullock, CCC-A  Licensed Senior Audiologist           "

## 2024-12-11 ENCOUNTER — APPOINTMENT (OUTPATIENT)
Dept: OTOLARYNGOLOGY | Facility: CLINIC | Age: 39
End: 2024-12-11
Payer: COMMERCIAL

## 2024-12-11 ENCOUNTER — APPOINTMENT (OUTPATIENT)
Dept: AUDIOLOGY | Facility: CLINIC | Age: 39
End: 2024-12-11
Payer: COMMERCIAL

## 2024-12-11 VITALS
WEIGHT: 279 LBS | HEIGHT: 76 IN | BODY MASS INDEX: 33.97 KG/M2 | SYSTOLIC BLOOD PRESSURE: 125 MMHG | DIASTOLIC BLOOD PRESSURE: 81 MMHG

## 2024-12-11 DIAGNOSIS — H61.23 BILATERAL IMPACTED CERUMEN: Primary | ICD-10-CM

## 2024-12-11 DIAGNOSIS — M26.609 TMJ DYSFUNCTION: ICD-10-CM

## 2024-12-11 DIAGNOSIS — H69.93 DYSFUNCTION OF BOTH EUSTACHIAN TUBES: ICD-10-CM

## 2024-12-11 PROCEDURE — 3008F BODY MASS INDEX DOCD: CPT

## 2024-12-11 PROCEDURE — 99203 OFFICE O/P NEW LOW 30 MIN: CPT

## 2024-12-11 PROCEDURE — 1036F TOBACCO NON-USER: CPT

## 2024-12-11 PROCEDURE — 69210 REMOVE IMPACTED EAR WAX UNI: CPT

## 2024-12-11 NOTE — PATIENT INSTRUCTIONS
INSTRUCTIONS FOR SAFE EAR CLEANING AT HOME:   We do NOT recommend placing ANYTHING in the ear canal. Doing so may cause wax to be pushed back into the ear canal and stuck. It also risks injury to the ear canal and ear drum. We recommend avoiding using cotton tipped applicators, tissues, paper, or any rigid object in the ear canal. Some people require regular cleanings every year or so to keep the ear canals open.  To clean the ears, wash the external ear with a cloth, but do not insert anything into the ear canal. Most cases of ear wax blockage respond to home treatments used to soften wax. You may try placing 1-2 drops of mineral oil or baby oil, no more than once a week, to help keep the wax soft.     NASAL STEROID SPRAY INSTRUCTIONS: Please use the recommended topical nasal spray as directed. BE SURE TO POINT THE SPRAY TOWARDS THE CORNER OF THE EYE ON THE SAME SIDE NOSTRIL. This will ensure you are treating the appropriate parts of your nose that are swollen or inflamed. This medication will take upwards of one month before you notice full benefit. You MUST use it every day for it to be effective. This medication may be purchase over-the-counter or prescription may be submitted upon request.

## 2024-12-11 NOTE — PROGRESS NOTES
Patient ID: Ethan Temple is a 39 y.o. male who presents for an initial assessment of earwax removal. Patient presents as a referral from Dr. Santiago Rosales (PCP).     PROVIDER IMPRESSIONS:  DIAGNOSES/PROBLEMS:  - Cerumen impaction of both ears   - TMJ dysfunction  - Bilateral Eustachian tube dysfunction      ASSESSMENT: Ethan Temple is a 39 y.o. male who presents for an initial encounter with symptoms of bilateral hearing difficulty, mild left otalgia, and left ear pressure that radiates to the left preauricular space. The clinical findings that are consistent with bilateral cerumen impaction, TMJ dysfunction, and likely bilateral ETD. Using appropriate instrumentation, impacted cerumen was successfully removed from the affected EAC(s). Reassurance provided to patient that otologic exam today revealed no evidence of acute infection/inflammation in the EAC bilaterally and that TM appears with no evidence of infection, effusion, retraction or perforation bilaterally.  Audiogram reviewed in detail with the patient, which showed normal hearing function results with type C tympanogram in the left ear and type A/C tympanogram in the right ear indicative of negative middle ear pressure. However, exam today showed appropriate TM mobility with auto-insufflation under microscopy in bilateral ears. I explained to patient that initial management approach will include conservative ETD management, TMD management, and close surveillance of treatment outcomes.     PLAN:   -I counseled patient on safe interventions for cerumen management at home. Patient may wash the external ear with a cloth. I reinforced education to the patient that they should avoid using cotton tipped applicators, tissues, paper, or any rigid object in the ear canal. I explained to the patient that doing so may cause wax to be pushed back into the ear canal and stuck and also risks injury to the ear canal and ear drum.   - I recommended  implementing the following lifestyle strategies for TMJ symptom management: Initiate a soft diet for the next 2-3 weeks and avoid eating chewy/tough foods such as gum, raw fruits/vegetables, tough meats, or anything else that requires significant mastication. Examples of appropriate soft foods include mashed potatoes, soft pasta, macaroni, yogurt, ice cream, and other things may easily be mashed with a fork. Perform temple and cheekbone massages twice daily for several minutes by using your knuckles to gently massage in circles near temples and along your cheekbones as tolerated. Apply a warm/cold compress along the entire side of the affected face, directly over TMJ structures, once or twice daily for 20 minutes at a time and remove sooner if skin irritation occurs. Consider purchase of a custom nighttime mouth guard from a dentist to prevent jaw clenching and/or teeth grinding while asleep. If facial pain is present, may use o.t.c.  ibuprofen 600 mg three times a day for three days only with meals, advised to monitor for side effects of upset stomach and ulcers if ibuprofen is taken on an empty stomach and advised to avoid NSAIDs if previously deemed as contraindicated.  -I recommended starting consistent trial of nasal steroid spray  Flonase Sensimist to maintain ET orifice patency. I recommended either 2 puffs into each nostril daily, or 1 puff into each nostril twice daily for a consistent trial of at least 4-6 weeks. Patient counseled that this medication is a topical intranasal steroid nasal spray indicated to reduce nasal inflammation. Patient was educated on proper administration of nasal spray, by tilting their head down and pointing the applicator tip towards the lateral wall of the nose/corner of the eye on the same side. I advised patient to avoid pointing applicator toward the septum due to the risk of nasal bleeding.  Patient informed to discontinue the spray if they experience adverse side effects.  "This medication may be purchased over the counter; a prescription may be sent to the patient's pharmacy upon request.  -Patient instructed to practice autoinsufflation (pinch your nose and pop your ears) several times per day to exercise the Eustachian Tube.   -Follow-up: Patient may schedule for follow-up in 6 weeks with repeat audiogram to evaluate treatment outcomes. They may follow up with me as requested, sooner if needed. All questions answered to patient satisfaction.      Subjective    HPI: Ethan Temple \"JANI\" is a 39 y.o. male referred by PCP  for clinical evaluation of bilateral hearing difficulty and with the request for earwax removal. States that for the past few months he has gradually noticed difficulty hearing in both ears with communication. He reports associated symptoms of mild left ear aching/pain deep in the inner for the past few weeks. States that he also has intermittently noticed mild ear pressure localized to the left preauricular space. When asked about the presence of associated otologic symptoms, including diminished hearing, tinnitus, ear drainage, ear itching, aural fullness, autophony, dizziness or vertigo, the patient admits to none. Mentions he has noticed recent increase in nasal congestion which he attributes to seasonal allergies. Reports that he typically takes flights for work 1-2 times per month. Denies any known habitual bruxism. When asked about pertinent otologic history, the patient denies a history of recurrent ear infections, denies history of ear surgery, denies history of PE tube insertion, and denies history of prolonged/traumatic loud noise exposure. The patient does not have family history of hearing loss. The patient does insert Q-tips in the ear canals daily. The patient denies insertion of other foreign objects into the ear canals. The patient denies prior history of cerumen impaction. They have not been using ear drops to loosen wax immediately prior " to this visit.     REVIEW OF SYSTEMS:  All other systems negative.    Objective   Physical Exam:  Right Ear: External inspection of ear with no deformity, scars, or masses. EAC is partially impacted with cerumen. Unable to visualize tympanic membrane.  Left Ear: External inspection of ear with no deformity, scars, or masses. EAC is partially impacted with cerumen. Unable to visualize tympanic membrane.   TMJ: Mild left TMJ crepitus with jaw-opening maneuver on bimanual palpation, no trismus.  Nose: External inspection of nose: No nasal lesions, lacerations or scars. Anterior rhinoscopy with limited visualization past the inferior turbinates: Septum is deviated to the left.  No septal perforation or lesions. No septal hematoma/ seroma.  No signs of bleeding.  No evidence of intranasal polyps.  Inferior turbinates are hypertrophied. Nasal mucosa appears mildly dry and hyperemic.   Neurologic: Cranial nerves II-XII grossly intact and symmetric bilaterally.  Head and Face:  Head: Atraumatic with no masses, lesions or scarring.  Face: Normal symmetry. No scars or deformities.  Eyes: Conjunctiva not edematous or erythematous.   Neck: Normal appearing, symmetric, trachea midline.   Cardiovascular: Examination of peripheral vascular system shows no clubbing or cyanosis.  Respiratory: No respiratory distress increased work of breathing. Inspection of the chest with symmetric chest expansion and normal respiratory effort.  Skin: No head and neck rashes.  Lymph nodes: No adenopathy.     EAR CLEANING PROCEDURE NOTE:  Indication: Cerumen impaction  Location: bilateral ear canals  Procedure Note: The procedure was performed by the provider.  Visualization Instrument: A microscope with a #5 speculum was placed in the ear canals to visualize the ear canal debris.  Ear Cleaning Instrument and Outcome: Using the 7 suction and alligator forceps, a moderate amount of soft, yellow cerumen was removed from the impacted EAC(s).  Patient  Status: The patient tolerated the procedure well.  Complications: There were no complications.  Post-Procedure/Microscopic Otologic Exam:  Right Ear--EAC is clear. TM is intact with no sign of infection, effusion, or retraction.  No perforation seen. Auto insufflation is visible under microscopy with appropriate TM mobility.  Left Ear-- EAC is clear. TM is intact with no sign of infection, effusion, or retraction.  No perforation seen. Auto insufflation is visible under microscopy with appropriate TM mobility.    RESULTS:  I personally reviewed the patient's audiogram from  12/10/24 , which revealed the following results: Normal hearing across all frequencies in bilateral ears. Type A/C tympanogram in the right ear (with notable negative middle ear pressure), and type C tympanogram in the left ear. Excellent WRS bilaterally at 50 dB. Acoustic reflexes present right ipsilateral, and present left ipsilateral except absent bilaterally at 4000 Hz.

## 2024-12-24 DIAGNOSIS — F41.9 ANXIETY: ICD-10-CM

## 2024-12-24 RX ORDER — VENLAFAXINE HYDROCHLORIDE 37.5 MG/1
37.5 CAPSULE, EXTENDED RELEASE ORAL DAILY
Qty: 30 CAPSULE | Refills: 0 | Status: SHIPPED | OUTPATIENT
Start: 2024-12-24

## 2025-01-10 DIAGNOSIS — M1A.09X0 CHRONIC GOUT OF MULTIPLE SITES, UNSPECIFIED CAUSE: ICD-10-CM

## 2025-01-10 RX ORDER — FEBUXOSTAT 80 MG/1
80 TABLET, FILM COATED ORAL DAILY
Qty: 90 TABLET | Refills: 3 | Status: SHIPPED | OUTPATIENT
Start: 2025-01-10

## 2025-01-22 ENCOUNTER — APPOINTMENT (OUTPATIENT)
Dept: AUDIOLOGY | Facility: CLINIC | Age: 40
End: 2025-01-22
Payer: COMMERCIAL

## 2025-01-22 ENCOUNTER — APPOINTMENT (OUTPATIENT)
Dept: OTOLARYNGOLOGY | Facility: CLINIC | Age: 40
End: 2025-01-22
Payer: COMMERCIAL

## 2025-01-27 DIAGNOSIS — F41.9 ANXIETY: ICD-10-CM

## 2025-01-27 RX ORDER — VENLAFAXINE HYDROCHLORIDE 37.5 MG/1
37.5 CAPSULE, EXTENDED RELEASE ORAL DAILY
Qty: 30 CAPSULE | Refills: 3 | Status: SHIPPED | OUTPATIENT
Start: 2025-01-27

## 2025-05-01 DIAGNOSIS — E66.811 OBESITY (BMI 30.0-34.9): Primary | ICD-10-CM

## 2025-05-01 RX ORDER — TIRZEPATIDE 2.5 MG/.5ML
2.5 INJECTION, SOLUTION SUBCUTANEOUS
Qty: 2 ML | Refills: 3 | Status: SHIPPED | OUTPATIENT
Start: 2025-05-01 | End: 2025-05-01

## 2025-05-01 RX ORDER — TIRZEPATIDE 2.5 MG/.5ML
2.5 INJECTION, SOLUTION SUBCUTANEOUS
Qty: 2 ML | Refills: 3 | Status: SHIPPED | OUTPATIENT
Start: 2025-05-01

## 2025-05-31 DIAGNOSIS — F41.9 ANXIETY: ICD-10-CM

## 2025-06-02 RX ORDER — VENLAFAXINE HYDROCHLORIDE 37.5 MG/1
37.5 CAPSULE, EXTENDED RELEASE ORAL DAILY
Qty: 30 CAPSULE | Refills: 0 | Status: SHIPPED | OUTPATIENT
Start: 2025-06-02

## 2025-07-02 DIAGNOSIS — F41.9 ANXIETY: ICD-10-CM

## 2025-07-03 RX ORDER — VENLAFAXINE HYDROCHLORIDE 37.5 MG/1
37.5 CAPSULE, EXTENDED RELEASE ORAL DAILY
Qty: 30 CAPSULE | Refills: 0 | Status: SHIPPED | OUTPATIENT
Start: 2025-07-03

## 2025-07-23 DIAGNOSIS — M1A.09X0 CHRONIC GOUT OF MULTIPLE SITES, UNSPECIFIED CAUSE: ICD-10-CM

## 2025-07-24 RX ORDER — COLCHICINE 0.6 MG/1
0.6 CAPSULE ORAL 2 TIMES DAILY
Qty: 60 CAPSULE | Refills: 1 | Status: SHIPPED | OUTPATIENT
Start: 2025-07-24

## 2025-07-30 DIAGNOSIS — F41.9 ANXIETY: ICD-10-CM

## 2025-07-30 RX ORDER — VENLAFAXINE HYDROCHLORIDE 37.5 MG/1
37.5 CAPSULE, EXTENDED RELEASE ORAL DAILY
Qty: 30 CAPSULE | Refills: 3 | Status: SHIPPED | OUTPATIENT
Start: 2025-07-30

## (undated) DEVICE — NEEDLE, HYPODERMIC, 25 G X 1.5 IN, A BEVEL, STERILE

## (undated) DEVICE — TUBING, SUCTION, 6MM X 10, CLEAN N-COND

## (undated) DEVICE — ELECTRODE, ELECTROSURGICAL, BLADE, INSULATED, ENT/IMA, STERILE

## (undated) DEVICE — GLOVE, SURGICAL, PROTEXIS PI , 7.0, PF, LF

## (undated) DEVICE — HEMOSTAT, ARISTA, ABSORBABLE, 3 GRAMS

## (undated) DEVICE — APPLICATOR, CHLORAPREP, W/ORANGE TINT, 26ML

## (undated) DEVICE — PAD, GROUNDING, ELECTROSURGICAL, W/9 FT CABLE, POLYHESIVE II, ADULT, LF

## (undated) DEVICE — SUTURE, SILK, 2-0, 30 IN, SH, BLACK

## (undated) DEVICE — SUTURE, MONOCRYL, 4-0, 18 IN, PS2, UNDYED

## (undated) DEVICE — SUCTION TIP , YANKAUER, W/BULB SUCTION

## (undated) DEVICE — TOWEL, SURGICAL, NEURO, O/R, 16 X 26, BLUE, STERILE

## (undated) DEVICE — SUTURE, VICRYL, 3-0, 27 IN, SH

## (undated) DEVICE — STRIP, SKIN CLOSURE, STERI STRIP, REINFORCED, 0.5 X 4 IN

## (undated) DEVICE — SYRINGE, 10 CC, LUER LOCK

## (undated) DEVICE — PACK, BASIC

## (undated) DEVICE — ADHESIVE, SKIN, LIQUIBAND EXCEED

## (undated) DEVICE — DRAPE, SHEET, LAPAROTOMY